# Patient Record
Sex: MALE | Race: WHITE | NOT HISPANIC OR LATINO | URBAN - METROPOLITAN AREA
[De-identification: names, ages, dates, MRNs, and addresses within clinical notes are randomized per-mention and may not be internally consistent; named-entity substitution may affect disease eponyms.]

---

## 2023-07-09 ENCOUNTER — HOSPITAL ENCOUNTER (EMERGENCY)
Facility: HOSPITAL | Age: 28
Discharge: HOME | End: 2023-07-09
Attending: STUDENT IN AN ORGANIZED HEALTH CARE EDUCATION/TRAINING PROGRAM

## 2023-07-09 VITALS
DIASTOLIC BLOOD PRESSURE: 59 MMHG | RESPIRATION RATE: 13 BRPM | SYSTOLIC BLOOD PRESSURE: 94 MMHG | OXYGEN SATURATION: 93 % | HEART RATE: 69 BPM | TEMPERATURE: 98 F

## 2023-07-09 DIAGNOSIS — R06.02 SOB (SHORTNESS OF BREATH): ICD-10-CM

## 2023-07-09 DIAGNOSIS — F14.10 COCAINE ABUSE: ICD-10-CM

## 2023-07-09 DIAGNOSIS — T50.901A ACCIDENTAL OVERDOSE, INITIAL ENCOUNTER: Primary | ICD-10-CM

## 2023-07-09 LAB
ALBUMIN SERPL-MCNC: 3.8 G/DL (ref 3.5–5)
ALBUMIN/GLOB SERPL: 1.5 RATIO (ref 1.1–2)
ALP SERPL-CCNC: 55 UNIT/L (ref 40–150)
ALT SERPL-CCNC: 22 UNIT/L (ref 0–55)
AMPHET UR QL SCN: POSITIVE
APPEARANCE UR: CLEAR
AST SERPL-CCNC: 23 UNIT/L (ref 5–34)
BACTERIA #/AREA URNS AUTO: ABNORMAL /HPF
BARBITURATE SCN PRESENT UR: NEGATIVE
BASOPHILS # BLD AUTO: 0.04 X10(3)/MCL
BASOPHILS NFR BLD AUTO: 0.5 %
BENZODIAZ UR QL SCN: NEGATIVE
BILIRUB UR QL STRIP.AUTO: ABNORMAL MG/DL
BILIRUBIN DIRECT+TOT PNL SERPL-MCNC: 0.6 MG/DL
BUN SERPL-MCNC: 10.3 MG/DL (ref 8.9–20.6)
CALCIUM SERPL-MCNC: 9 MG/DL (ref 8.4–10.2)
CANNABINOIDS UR QL SCN: POSITIVE
CHLORIDE SERPL-SCNC: 107 MMOL/L (ref 98–107)
CO2 SERPL-SCNC: 25 MMOL/L (ref 22–29)
COCAINE UR QL SCN: POSITIVE
COLOR UR: ABNORMAL
CREAT SERPL-MCNC: 0.91 MG/DL (ref 0.73–1.18)
EOSINOPHIL # BLD AUTO: 0.27 X10(3)/MCL (ref 0–0.9)
EOSINOPHIL NFR BLD AUTO: 3.4 %
ERYTHROCYTE [DISTWIDTH] IN BLOOD BY AUTOMATED COUNT: 13.1 % (ref 11.5–17)
ETHANOL SERPL-MCNC: <10 MG/DL
FENTANYL UR QL SCN: NEGATIVE
GFR SERPLBLD CREATININE-BSD FMLA CKD-EPI: >60 MLS/MIN/1.73/M2
GLOBULIN SER-MCNC: 2.5 GM/DL (ref 2.4–3.5)
GLUCOSE SERPL-MCNC: 133 MG/DL (ref 74–100)
GLUCOSE UR QL STRIP.AUTO: NEGATIVE MG/DL
HCT VFR BLD AUTO: 39 % (ref 42–52)
HGB BLD-MCNC: 13.4 G/DL (ref 14–18)
IMM GRANULOCYTES # BLD AUTO: 0.05 X10(3)/MCL (ref 0–0.04)
IMM GRANULOCYTES NFR BLD AUTO: 0.6 %
KETONES UR QL STRIP.AUTO: NEGATIVE MG/DL
LEUKOCYTE ESTERASE UR QL STRIP.AUTO: NEGATIVE UNIT/L
LYMPHOCYTES # BLD AUTO: 1.65 X10(3)/MCL (ref 0.6–4.6)
LYMPHOCYTES NFR BLD AUTO: 20.7 %
MCH RBC QN AUTO: 30 PG (ref 27–31)
MCHC RBC AUTO-ENTMCNC: 34.4 G/DL (ref 33–36)
MCV RBC AUTO: 87.4 FL (ref 80–94)
MDMA UR QL SCN: NEGATIVE
MONOCYTES # BLD AUTO: 0.46 X10(3)/MCL (ref 0.1–1.3)
MONOCYTES NFR BLD AUTO: 5.8 %
NEUTROPHILS # BLD AUTO: 5.52 X10(3)/MCL (ref 2.1–9.2)
NEUTROPHILS NFR BLD AUTO: 69 %
NITRITE UR QL STRIP.AUTO: NEGATIVE
NRBC BLD AUTO-RTO: 0 %
OPIATES UR QL SCN: NEGATIVE
PCP UR QL: NEGATIVE
PH UR STRIP.AUTO: 6 [PH]
PH UR: 6 [PH] (ref 3–11)
PLATELET # BLD AUTO: 291 X10(3)/MCL (ref 130–400)
PMV BLD AUTO: 9.8 FL (ref 7.4–10.4)
POTASSIUM SERPL-SCNC: 3.4 MMOL/L (ref 3.5–5.1)
PROT SERPL-MCNC: 6.3 GM/DL (ref 6.4–8.3)
PROT UR QL STRIP.AUTO: ABNORMAL MG/DL
RBC # BLD AUTO: 4.46 X10(6)/MCL (ref 4.7–6.1)
RBC #/AREA URNS AUTO: <5 /HPF
RBC UR QL AUTO: NEGATIVE UNIT/L
SODIUM SERPL-SCNC: 139 MMOL/L (ref 136–145)
SP GR UR STRIP.AUTO: 1.02 (ref 1–1.03)
SPECIFIC GRAVITY, URINE AUTO (.000) (OHS): 1.02 (ref 1–1.03)
SQUAMOUS #/AREA URNS AUTO: 5 /HPF
UROBILINOGEN UR STRIP-ACNC: 1 MG/DL
WBC # SPEC AUTO: 7.99 X10(3)/MCL (ref 4.5–11.5)
WBC #/AREA URNS AUTO: 12 /HPF

## 2023-07-09 PROCEDURE — 82077 ASSAY SPEC XCP UR&BREATH IA: CPT | Performed by: STUDENT IN AN ORGANIZED HEALTH CARE EDUCATION/TRAINING PROGRAM

## 2023-07-09 PROCEDURE — 81001 URINALYSIS AUTO W/SCOPE: CPT | Performed by: STUDENT IN AN ORGANIZED HEALTH CARE EDUCATION/TRAINING PROGRAM

## 2023-07-09 PROCEDURE — 87088 URINE BACTERIA CULTURE: CPT | Performed by: STUDENT IN AN ORGANIZED HEALTH CARE EDUCATION/TRAINING PROGRAM

## 2023-07-09 PROCEDURE — 93005 ELECTROCARDIOGRAM TRACING: CPT

## 2023-07-09 PROCEDURE — 85025 COMPLETE CBC W/AUTO DIFF WBC: CPT | Performed by: STUDENT IN AN ORGANIZED HEALTH CARE EDUCATION/TRAINING PROGRAM

## 2023-07-09 PROCEDURE — 80307 DRUG TEST PRSMV CHEM ANLYZR: CPT | Performed by: STUDENT IN AN ORGANIZED HEALTH CARE EDUCATION/TRAINING PROGRAM

## 2023-07-09 PROCEDURE — 80053 COMPREHEN METABOLIC PANEL: CPT | Performed by: STUDENT IN AN ORGANIZED HEALTH CARE EDUCATION/TRAINING PROGRAM

## 2023-07-09 PROCEDURE — 96375 TX/PRO/DX INJ NEW DRUG ADDON: CPT

## 2023-07-09 PROCEDURE — 99291 CRITICAL CARE FIRST HOUR: CPT

## 2023-07-09 PROCEDURE — 96374 THER/PROPH/DIAG INJ IV PUSH: CPT

## 2023-07-09 PROCEDURE — 63600175 PHARM REV CODE 636 W HCPCS

## 2023-07-09 PROCEDURE — 93010 ELECTROCARDIOGRAM REPORT: CPT | Mod: ,,, | Performed by: INTERNAL MEDICINE

## 2023-07-09 PROCEDURE — 93010 EKG 12-LEAD: ICD-10-PCS | Mod: ,,, | Performed by: INTERNAL MEDICINE

## 2023-07-09 RX ORDER — ONDANSETRON 2 MG/ML
INJECTION INTRAMUSCULAR; INTRAVENOUS
Status: COMPLETED
Start: 2023-07-09 | End: 2023-07-09

## 2023-07-09 RX ORDER — NALOXONE HYDROCHLORIDE 1 MG/ML
INJECTION INTRAMUSCULAR; INTRAVENOUS; SUBCUTANEOUS
Qty: 2 ML | Refills: 0 | Status: SHIPPED | OUTPATIENT
Start: 2023-07-09

## 2023-07-09 RX ORDER — NALOXONE HCL 0.4 MG/ML
VIAL (ML) INJECTION
Status: COMPLETED
Start: 2023-07-09 | End: 2023-07-09

## 2023-07-09 RX ADMIN — NALOXONE HYDROCHLORIDE 0.4 MG: 0.4 INJECTION, SOLUTION INTRAMUSCULAR; INTRAVENOUS; SUBCUTANEOUS at 06:07

## 2023-07-09 RX ADMIN — ONDANSETRON 4 MG: 2 INJECTION INTRAMUSCULAR; INTRAVENOUS at 06:07

## 2023-07-09 NOTE — ED PROVIDER NOTES
Encounter Date: 7/9/2023    SCRIBE #1 NOTE: I, Emy Mcallister, am scribing for, and in the presence of,  Jeanmarie King MD. I have scribed the following portions of the note - the EKG reading. Other sections scribed: HPI, ROS, PE.     History     Chief Complaint   Patient presents with    Drug Overdose     AASI after overdose. Found down in park. GCS 14, 2 narcan given en route aroused him more     Patient is a 27 year old male with no significant hx that presents to the ED following an overdose today. Per EMS, patient was found unconscious at a park; he was awaken with painful stimuli. Lethargic on scene. 2 of Narcan given en route; patient became more alert. Patient reports he did not take anything. He reports he smokes cigarettes. He denies alcohol and drug use. He denies SI. Patient prescribed Suboxone.     The history is provided by the patient, medical records and the EMS personnel. No  was used.   Drug Overdose  This is a new problem. The current episode started less than 1 hour ago. The problem has been gradually improving. Nothing aggravates the symptoms. Nothing relieves the symptoms.       Review of patient's allergies indicates:  Not on File  History reviewed. No pertinent past medical history.  History reviewed. No pertinent surgical history.  History reviewed. No pertinent family history.     Review of Systems   Unable to perform ROS: Mental status change (Overdose)     Physical Exam     Initial Vitals [07/09/23 1822]   BP Pulse Resp Temp SpO2   (!) 102/52 (!) 111 14 98.8 °F (37.1 °C) (!) 92 %      MAP       --         Physical Exam    Nursing note and vitals reviewed.  Constitutional: He appears well-developed and well-nourished. He is not diaphoretic. No distress.   HENT:   Head: Normocephalic and atraumatic.   Eyes: Conjunctivae and EOM are normal. Pupils are equal, round, and reactive to light.   Neck:   Normal range of motion.  Cardiovascular:  Regular rhythm, normal heart  sounds and intact distal pulses.           No murmur heard.  Tachycardic   Pulmonary/Chest: No respiratory distress. He has no wheezes. He has no rales.   Bradypnea   Abdominal: Abdomen is soft. He exhibits no distension. There is no abdominal tenderness.   Musculoskeletal:         General: No tenderness or edema. Normal range of motion.      Cervical back: Normal range of motion.     Neurological: No cranial nerve deficit.   Drowsy, awakens to painful stimuli.   Skin: Skin is warm and dry. Capillary refill takes less than 2 seconds. No rash noted. No erythema.   Psychiatric: He has a normal mood and affect.       ED Course   Critical Care    Date/Time: 7/9/2023 7:18 PM  Performed by: Jeanmarie King MD  Authorized by: Jeanmarie King MD   Direct patient critical care time: 9 minutes  Additional history critical care time: 5 minutes  Ordering / reviewing critical care time: 6 minutes  Documentation critical care time: 8 minutes  Consulting other physicians critical care time: 4 minutes  Total critical care time (exclusive of procedural time) : 32 minutes  Critical care time was exclusive of separately billable procedures and treating other patients and teaching time.  Critical care was necessary to treat or prevent imminent or life-threatening deterioration of the following conditions: toxidrome (Drug overdose).  Critical care was time spent personally by me on the following activities: discussions with consultants, discussions with primary provider, interpretation of cardiac output measurements, evaluation of patient's response to treatment, examination of patient, obtaining history from patient or surrogate, ordering and performing treatments and interventions, ordering and review of laboratory studies, ordering and review of radiographic studies, pulse oximetry, re-evaluation of patient's condition and review of old charts.      Labs Reviewed   COMPREHENSIVE METABOLIC PANEL - Abnormal; Notable for the following  components:       Result Value    Potassium Level 3.4 (*)     Glucose Level 133 (*)     Protein Total 6.3 (*)     All other components within normal limits   URINALYSIS, REFLEX TO URINE CULTURE - Abnormal; Notable for the following components:    Protein, UA 2+ (*)     Bilirubin, UA 1+ (*)     All other components within normal limits   DRUG SCREEN, URINE (BEAKER) - Abnormal; Notable for the following components:    Amphetamines, Urine Positive (*)     Cannabinoids, Urine Positive (*)     Cocaine, Urine Positive (*)     All other components within normal limits    Narrative:     Cut off concentrations:    Amphetamines - 1000 ng/ml  Barbiturates - 200 ng/ml  Benzodiazepine - 200 ng/ml  Cannabinoids (THC) - 50 ng/ml  Cocaine - 300 ng/ml  Fentanyl - 1.0 ng/ml  MDMA - 500 ng/ml  Opiates - 300 ng/ml   Phencyclidine (PCP) - 25 ng/ml    Specimen submitted for drug analysis and tested for pH and specific gravity in order to evaluate sample integrity. Suspect tampering if specific gravity is <1.003 and/or pH is not within the range of 4.5 - 8.0  False negatives may result form substances such as bleach added to urine.  False positives may result for the presence of a substance with similar chemical structure to the drug or its metabolite.    This test provides only a PRELIMINARY analytical test result. A more specific alternate chemical method must be used in order to obtain a confirmed analytical result. Gas chromatography/mass spectrometry (GC/MS) is the preferred confirmatory method. Other chemical confirmation methods are available. Clinical consideration and professional judgement should be applied to any drug of abuse test result, particularly when preliminary positive results are used.    Positive results will be confirmed only at the physicians request. Unconfirmed screening results are to be used only for medical purposes (treatment).        CBC WITH DIFFERENTIAL - Abnormal; Notable for the following components:     RBC 4.46 (*)     Hgb 13.4 (*)     Hct 39.0 (*)     IG# 0.05 (*)     All other components within normal limits   URINALYSIS, MICROSCOPIC - Abnormal; Notable for the following components:    WBC, UA 12 (*)     All other components within normal limits   ALCOHOL,MEDICAL (ETHANOL) - Normal   CULTURE, URINE   CBC W/ AUTO DIFFERENTIAL    Narrative:     The following orders were created for panel order CBC auto differential.  Procedure                               Abnormality         Status                     ---------                               -----------         ------                     CBC with Differential[[id number]]        Abnormal            Final result                 Please view results for these tests on the individual orders.     EKG Readings: (Independently Interpreted)   Initial Reading: No STEMI. Rhythm: Normal Sinus Rhythm. Heart Rate: 71. Ectopy: No Ectopy. Conduction: Normal. ST Segments: Normal ST Segments. T Waves: Normal. Axis: Normal. Clinical Impression: Normal Sinus Rhythm   EKG performed at 1910.     ECG Results              EKG 12-lead (Final result)  Result time 07/10/23 11:41:01      Final result by Interface, Lab In [facility] (07/10/23 11:41:01)                   Narrative:    Test Reason : R06.02,    Vent. Rate : 071 BPM     Atrial Rate : 071 BPM     P-R Int : 134 ms          QRS Dur : 086 ms      QT Int : 384 ms       P-R-T Axes : 032 078 027 degrees     QTc Int : 417 ms    Normal sinus rhythm  Normal ECG  No previous ECGs available  Confirmed by Yevgeniy Jacobson MD, Bubba VERNON ([id number]) on 7/10/2023 11:40:55 AM    Referred By:             Confirmed By:Bubba Jacobson                                  Imaging Results              X-Ray Chest AP Portable (Final result)  Result time 07/09/23 18:42:02      Final result by Jimi Clements MD (07/09/23 18:42:02)                   Impression:      No acute findings in the chest      Electronically signed by: Jimi Clements  MD  Date:    07/09/2023  Time:    18:42               Narrative:    EXAMINATION:  XR CHEST AP PORTABLE    CLINICAL HISTORY:  Shortness of breath    COMPARISON:  None    FINDINGS:  Single view of the chest shows no focal consolidation, pneumothorax or pleural effusion.  Cardiac silhouette and pulmonary vasculature are normal.                                    X-Rays:   Independently Interpreted Readings:   Chest X-Ray: No infiltrates.  Normal heart size.  No acute abnormalities.   Medications   naloxone (NARCAN) 0.4 mg/mL injection (0.4 mg Intravenous Given 7/9/23 1845)   ondansetron 4 mg/2 mL injection (4 mg Intravenous Given 7/9/23 1845)     Medical Decision Making:   History:   I obtained history from: EMS provider and someone other than patient.       <> Summary of History: Per EMS, patient was found unconscious at a park; he was awaken with painful stimuli. Lethargic on scene. 2 of Narcan given en route; patient became more alert but still lethargic. Patient reports he did not take anything.   Old Medical Records: I decided to obtain old medical records.  Old Records Summarized: records from another hospital and records from previous admission(s).       <> Summary of Records: Reviewed old records, no previous history of substance overdose noted in [facility] EMR.  Initial Assessment:   Drug OD  Differential Diagnosis:   Judging by the patient's chief complaint and pertinent history, the patient has the following possible differential diagnoses, including but not limited to the following.  Some of these are deemed to be lower likelihood and some more likely based on my physical exam and history combined with possible lab work and/or imaging studies.   Please see the pertinent studies, and refer to the HPI.  Some of these diagnoses will take further evaluation to fully rule out, perhaps as an outpatient and the patient was encouraged to follow up when discharged for more comprehensive evaluation.    Metabolic  abnormality, intoxication, toxic ingestion, polypharmacy     Independently Interpreted Test(s):   I have ordered and independently interpreted X-rays - see prior notes.  I have ordered and independently interpreted EKG Reading(s) - see prior notes  Clinical Tests:   Lab Tests: Ordered and Reviewed  The following lab test(s) were unremarkable: CMP and CBC  Radiological Study: Ordered and Reviewed  Medical Tests: Ordered and Reviewed  ED Management:    Patient is a 27-year-old male presents to the emergency department for suspected drug overdose.  Patient was found unconscious, given 2 of Narcan with awakening.  On arrival to the emergency department patient drowsy but responds to stimuli.  He initially denied taking any substances.  As a result labs and imaging obtained.  Lab work as noted.  Chest x-ray without any acute infiltrates.  He did require additional dose of Narcan in the emergency department.  He was observed in the emergency department for 4 hours with no recurrence of somnolence, he is awake alert tolerating PO.  After urine drug screening, patient states he did use multiple substances including cocaine.  Counseled extensively.  [facility] who has provided the patient with a dose of Narcan.  Will additionally prescribe additional Narcan for the patient.  All results were discussed.  Answered all questions at this time.  Counseled extensively on substance use cessation.  Hemodynamically stable for continued outpatient management strict return precautions.  Patient verbalized understanding agreed to plan.       Medical Decision Making  Problems Addressed:  Accidental overdose, initial encounter: acute illness or injury that poses a threat to life or bodily functions  Cocaine abuse: acute illness or injury that poses a threat to life or bodily functions  SOB (shortness of breath): acute illness or injury that poses a threat to life or bodily functions    Amount and/or Complexity  of Data Reviewed  Independent Historian: EMS     Details: Per EMS, patient was found unconscious at a park; he was awaken with painful stimuli. Lethargic on scene. 2 of Narcan given en routePatient reports he did not take anything.  Labs: ordered.  Radiology: ordered and independent interpretation performed.  ECG/medicine tests: ordered and independent interpretation performed.    Risk  OTC drugs.  Prescription drug management.  Parenteral controlled substances.  Decision regarding hospitalization.  Diagnosis or treatment significantly limited by social determinants of health.    Critical Care  Total time providing critical care: 35 minutes         Scribe Attestation:   Scribe #1: I performed the above scribed service and the documentation accurately describes the services I performed. I attest to the accuracy of the note.    Attending Attestation:           Physician Attestation for Scribe:  Physician Attestation Statement for Scribe #1: I, Jeanmarie King MD, reviewed documentation, as scribed by Emy Mcallister in my presence, and it is both accurate and complete.           ED Course as of 07/16/23 1717   Sun Jul 09, 2023   1854 Paged [facility] [ED]   1920 On reassessment patient admits to using and smoking marijuana, cocaine, meth.  States has used heroin. [RP]   2057 [facility] has a evaluated the patient and provide resources including Narcan. [RP]   2205 Observed for several hours with no recurrence of somnolence.  Counseled extensively on substance use cessation.  Discussed all results.  Discussed need for follow-up.  Discussed return precautions.  Hemodynamically stable for continued outpatient management strict return precautions.  Patient verbalized understanding agreed to plan. [RP]      ED Course User Index  [ED] Emy Mcallister  [RP] Jeanmarie King MD                   Clinical Impression:   Final diagnoses:  [R06.02] SOB (shortness of breath)  [T50.901A] Accidental overdose, initial encounter (Primary)  [F14.10]  Cocaine abuse        ED Disposition Condition    Discharge Stable          ED Prescriptions       Medication Sig Dispense Start Date End Date Auth. Provider    naloxone (NARCAN) 1 mg/mL injection 2 mg (1 mg per nostril) by Nasal route as needed for opioid overdose; may repeat in 3 to 5 minutes if not effective. Call 911 2 mL 7/9/2023 -- Jeanmarie King MD          Follow-up Information       Follow up With Specialties Details Why Contact Info    Your primary care physician.        [facility] [employer]    Please call [phone].             Jeanmarie King MD  07/16/23 8690

## 2023-07-10 NOTE — DISCHARGE INSTRUCTIONS
[facility]  Please do not use any cocaine or any other drugs.    Please call [phone].    Return to the emergency department if you have any shortness of breath, difficulty breathing, fever, nausea, vomiting, chest pain, any new or worsening symptoms or for any other concerns.      Agency:  Contact Information:  Services Provided:  Insurance Accepted:    [facility] [address] [place] [address]  [phone] Adults: Detox; inpatient; outpatient; partial inpatient Private Insurance    [facility] [address], [address]th [place] [address]  [phone] Adults: Detox; inpatient Private Insurance   [facility] [address] [place], [place] [address] [phone] Adults and Adolescents (13-17): Inpatient; outpatient; transitional living; family therapy Medicare  Medicaid  Private Pay  Sliding Scale (Uninsured)   [facility] [address] [place], [place] [id number] [phone] Adults: Detox; inpatient; pregnant women Medicaid  Private Insurance   [facility] [address] [place] [address]  [phone] Adults: Detox; Detox for pregnant women Medicare  [facility]  Private Insurance  Medicaid [facility] [address] [place], [place] [id number] [phone] Adults: Detox; inpatient; group therapy Medicare  Medicaid  Private Insurance   [facility] [address] [place], [place] (Adult)     [address], [place] (Adolescent)    [phone] (Weekdays)  [phone] (Weekends) Adults and Adolescents (12-17): Inpatient; outpatient  Family: Pregnant women and women w/ kids up to 12-residental living recovery  Medicaid  Medicare  Private Insurance   [facility] [address] [place], [place] [address] [phone]. [phone] Adults: Detox; Inpatient Medicaid  Private Insurance   [facility] [address]  [place] [address]  [phone] Adults: Outpatient Private Insurance    [facility] [address]  [place], [place] [id number] [phone] Adult: Detox; inpatient Medicaid  Medicare    Private Insurance   [facility] [address]  [place] [address]  [phone] Adults: Outpatient  Medicaid  Private Insurance  Self-pay   [facility] [address] [place] [address]  [phone] Adults: Detox; Inpatient   Medicaid  Medicare  Private Insurance  VA Benefits   [facility] [address]  [place] [address]  [phone] All Ages: Substance abuse services and referrals for medical detox. Self pay, Medicaid, Medicare, Private insurance   [facility] [address] [place] [address]  [phone]    [address] [place] [address]  [phone] Adults: Outpatient; outpatient for use during pregnancy  No insurance accepted  Private pay only    [facility] [address] [facility] [address], [place] [address]  [phone] Adults: Inpatient Medicare  Private Insurance    [facility] [address] [place] [address]  [phone] Adults: Detox; inpatient; relapse program; intensive outpatient; family therapy Medicaid  Private Insurance    [address] [address] [phone] Adults: Detox; inpatient  Medicaid  Private Insurance    [facility] [address] [place] [address]  [phone] Adults: Detox; inpatient; treatment for pregnant women Medicaid  [employer] (Uninsured)   [facility] [address] [place] [address]  [phone] Adults: Inpatient; Partial inpatient; outpatient Medicaid  Private Insurance   [facility] [address]  [place] [address]  [phone] Adults: Outpatient Private Insurance    [facility] [address] [place]  [address]  [phone] Adults: Inpatient; outpatient; family support; aftercare support Private Insurance   [facility]  [address] [place] [address]  [phone] Adults and Adolescents (12-17): Detox; outpatient Medicaid  Medicare  Private Insurance   [facility] [address] [address]  [phone] Adults: Detox, Inpatient, Outpatient Private Insurance   24 HR Hotline:       [facility]  1-399.513.4773 (HELP) Free 24 HR assistance  N/A   National Helpline for Substance abuse 1-586.909.7370 Free 24 HR assistance N/A   Cocaine Anonymous 1-865.869.8072 Free 24 HR assistance  N/A   Poison Control-Overdose Hotline 1-863.635.6228 Free 24 HR assistance   N/A   Alcohol and Drug Helpline 1-443.190.6585 Free 24 HR assistance  N/A   National [place] of   Problem Gambling Helpline 1-446.814.1458 Free 24 HR assistance N/A

## 2023-07-11 LAB — BACTERIA UR CULT: NO GROWTH
